# Patient Record
Sex: FEMALE | Race: BLACK OR AFRICAN AMERICAN | NOT HISPANIC OR LATINO
[De-identification: names, ages, dates, MRNs, and addresses within clinical notes are randomized per-mention and may not be internally consistent; named-entity substitution may affect disease eponyms.]

---
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
## 2023-08-03 ENCOUNTER — APPOINTMENT (OUTPATIENT)
Dept: DERMATOLOGY | Facility: CLINIC | Age: 18
End: 2023-08-03
Payer: COMMERCIAL

## 2023-08-03 DIAGNOSIS — L81.0 POSTINFLAMMATORY HYPERPIGMENTATION: ICD-10-CM

## 2023-08-03 DIAGNOSIS — L90.5 SCAR CONDITIONS AND FIBROSIS OF SKIN: ICD-10-CM

## 2023-08-03 PROCEDURE — 99214 OFFICE O/P EST MOD 30 MIN: CPT

## 2023-08-03 RX ORDER — TRETINOIN 1 MG/G
0.1 CREAM TOPICAL
Qty: 1 | Refills: 6 | Status: ACTIVE | COMMUNITY
Start: 2018-09-29 | End: 1900-01-01

## 2023-08-03 RX ORDER — DOXYCYCLINE HYCLATE 100 MG/1
100 TABLET ORAL
Qty: 60 | Refills: 2 | Status: ACTIVE | COMMUNITY
Start: 2022-08-11 | End: 1900-01-01

## 2023-08-03 RX ORDER — CLINDAMYCIN PHOSPHATE 10 MG/ML
1 SOLUTION TOPICAL
Qty: 1 | Refills: 10 | Status: ACTIVE | COMMUNITY
Start: 2018-03-30 | End: 1900-01-01

## 2023-08-03 NOTE — HISTORY OF PRESENT ILLNESS
[FreeTextEntry1] : RPA- Acne [de-identified] : 17-year-old female presenting today for evaluation of the below  1. F/u Acne vulgaris, face and trunk x years. Last prescribed Doxy 100 mg PO daily x 3 months at Nov 2022 telehealth appointment with Dr. Hastings. Face was clear.  Washing with PanOxyl 10% BPO qHS, applying Tretinoin 0.05% qHS, tolerating well. Applying Clinda 1% swabs qAM, and CeraVe foaming wash qAM.  Applying SPF 50 daily. Denies flares with menses. No OCP, no IUD.  Mom with hx of acne, required PO medications.   No personal or family history of skin cancer.  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

## 2023-08-03 NOTE — PHYSICAL EXAM
[FreeTextEntry3] : Many pustules with few inflammatory papules on the chin, nose, cheeks and forehead admixed with comedones  Few small inflammatory papules on the back and chest admixed with comedones and surrounding small brown, hyperpigmented macules

## 2023-08-03 NOTE — ASSESSMENT
[FreeTextEntry1] : 1. Acne vulgaris, moderate inflammatory w/ comedonal component, flaring on the face and trunk S/p multiple 3-month courses of Doxycycline 100 mg PO daily.  - Diagnosis and course of condition discussed. - Given recalcitrance to topicals, continued flares off Doxycycline, and scarring from inadequate control, discussed risks/benefits/alternatives of Isotretinoin. Patient is NOT a candidate today as she is in college at [employer] and would be unable to make monthly in-person appointments.  - Explained that a typical course of isotretinoin can be approximately 5-8 months and that monthly in-office visits are required to continue on this therapy. - Explained that although isotretinoin works very well for acne it is not a cure. Reiterated that some patients may need a second (or even third) course of the medication. - Explained that acne may flare at the beginning of treatment. If acne flares significantly the dose of the medication can be lowered if needed. - Explained that while on isotretinoin all other acne medications, topical and systemic, will be discontinued. - Explained to patient that he may not share her medication with others or donate blood. Those of child-bearing potential must have two negative pregnancy tests prior to starting and cannot have children/conceive until at least 1 month after completing treatment. - Patient denied a history of depression or suicidal ideation. Explained to the patient that should this occur, the medication should be stopped immediately and he should seek immediate medical attention. - Reviewed common side effects including but not limited to dry skin, dry lips, dry eyes (may need to wear glasses instead of contact lenses during course of treatment, if symptoms are bothersome), nosebleeds, increased skin fragility, muscle aches, joint aches, possible transient hair loss, increase in triglycerides or cholesterol (which would necessitate a low-fat diet and possible discontinuation of the medication if indicated), possible decrease in night vision, and sun sensitivity.  - Reviewed uncommon but serious side effects, that should prompt discontinuation of the medication and re-evaluation, including but not limited to severe headaches, nausea, vomiting, diarrhea, change in bowel habits, severe stomach pain, rectal bleeding, yellowing of the skin, dark urine, blurred vision, poor night vision (avoid driving at night if so), bone and muscle aches, changes in mood, depression, and suicidal ideation. Explained that there is no proven link between isotretinoin use and inflammatory bowel disease; however, case reports have been noted. Medication may also cause pseudotumor cerebri and hyperostosis. - Avoid cometic procedure/surgery for a few months after discontinuing medication due to risk of scarring and photosensitivity  - Female patients MUST use two simultaneous methods of birth control: or abstinence alone.  PLAN: - Re-start Doxycycline 100 mg PO but plan for BID. Start Doxycycline 100mg BID, taken with food and water for 3 months.  We have discussed the proper use and possible associated adverse effects, including GI distress, photosensitivity, hypersensitivity reaction, and esophageal ulceration.  Patient is aware not to take medication with dairy. Patient counseled not to lie down for at least 1 hour after taking the medication.  - STOP washing with OTC BPO 10% qHS and switch to qAM.  - C/w Clindamycin 1% swabs to face, chest, and back qAM.  - INCREASE Tretinoin 0.05%>> 0.1% crm at bedtime. Apply pea size amt spread thinly over entire face, 30 min after washing face, every other night for 2 wks, then advance to qhs as tolerated. SED.  - Discussed liberal use of non- comedogenic moisturizers.  - Patient to check her sunscreen to make sure that it is non-comedogenic.   RTC 4-6 months when Patient is home from college or virtual appointment.

## 2024-01-17 ENCOUNTER — APPOINTMENT (OUTPATIENT)
Dept: DERMATOLOGY | Facility: CLINIC | Age: 19
End: 2024-01-17

## 2024-01-17 NOTE — ASSESSMENT
[FreeTextEntry1] : 1. Acne vulgaris, moderate inflammatory w/ comedonal component, flaring on the face and trunk S/p multiple 3-month courses of Doxycycline 100 mg PO daily. - Diagnosis and course of condition discussed. - Given recalcitrance to topicals, continued flares off Doxycycline, and scarring from inadequate control, discussed risks/benefits/alternatives of Isotretinoin. Patient is NOT a candidate today as she is in college at [employer] and would be unable to make monthly in-person appointments. - Explained that a typical course of isotretinoin can be approximately 5-8 months and that monthly in-office visits are required to continue on this therapy. - Explained that although isotretinoin works very well for acne it is not a cure. Reiterated that some patients may need a second (or even third) course of the medication. - Explained that acne may flare at the beginning of treatment. If acne flares significantly the dose of the medication can be lowered if needed. - Explained that while on isotretinoin all other acne medications, topical and systemic, will be discontinued. - Explained to patient that he may not share her medication with others or donate blood. Those of child-bearing potential must have two negative pregnancy tests prior to starting and cannot have children/conceive until at least 1 month after completing treatment. - Patient denied a history of depression or suicidal ideation. Explained to the patient that should this occur, the medication should be stopped immediately and he should seek immediate medical attention. - Reviewed common side effects including but not limited to dry skin, dry lips, dry eyes (may need to wear glasses instead of contact lenses during course of treatment, if symptoms are bothersome), nosebleeds, increased skin fragility, muscle aches, joint aches, possible transient hair loss, increase in triglycerides or cholesterol (which would necessitate a low-fat diet and possible discontinuation of the medication if indicated), possible decrease in night vision, and sun sensitivity. - Reviewed uncommon but serious side effects, that should prompt discontinuation of the medication and re-evaluation, including but not limited to severe headaches, nausea, vomiting, diarrhea, change in bowel habits, severe stomach pain, rectal bleeding, yellowing of the skin, dark urine, blurred vision, poor night vision (avoid driving at night if so), bone and muscle aches, changes in mood, depression, and suicidal ideation. Explained that there is no proven link between isotretinoin use and inflammatory bowel disease; however, case reports have been noted. Medication may also cause pseudotumor cerebri and hyperostosis. - Avoid cometic procedure/surgery for a few months after discontinuing medication due to risk of scarring and photosensitivity - Female patients MUST use two simultaneous methods of birth control: or abstinence alone.  PLAN: - Re-start Doxycycline 100 mg PO but plan for BID. Start Doxycycline 100mg BID, taken with food and water for 3 months. We have discussed the proper use and possible associated adverse effects, including GI distress, photosensitivity, hypersensitivity reaction, and esophageal ulceration. Patient is aware not to take medication with dairy. Patient counseled not to lie down for at least 1 hour after taking the medication.  - STOP washing with OTC BPO 10% qHS and switch to qAM. - C/w Clindamycin 1% swabs to face, chest, and back qAM. - INCREASE Tretinoin 0.05%>> 0.1% crm at bedtime. Apply pea size amt spread thinly over entire face, 30 min after washing face, every other night for 2 wks, then advance to qhs as tolerated. SED. - Discussed liberal use of non- comedogenic moisturizers. - Patient to check her sunscreen to make sure that it is non-comedogenic.  RTC 4-6 months when Patient is home from college or virtual appointment.

## 2024-01-17 NOTE — HISTORY OF PRESENT ILLNESS
[FreeTextEntry1] : RPA- Acne [de-identified] : 18-year-old female last seen August 2023, presenting today for evaluation of the below  1. F/u Acne vulgaris, face and trunk x years. Increased Tretinoin 0.1% at last visit.  Last prescribed Doxy 100 mg PO daily x 3 months at Nov 2022 telehealth appointment with Dr. Hastings. Face was clear.  Washing with PanOxyl 10% BPO qHS, applying Tretinoin 0.05% qHS, tolerating well. Applying Clinda 1% swabs qAM, and CeraVe foaming wash qAM.  Applying SPF 50 daily. Denies flares with menses. No OCP, no IUD.  Mom with hx of acne, required PO medications.   No personal or family history of skin cancer.  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

## 2024-06-06 ENCOUNTER — APPOINTMENT (OUTPATIENT)
Dept: DERMATOLOGY | Facility: CLINIC | Age: 19
End: 2024-06-06
Payer: COMMERCIAL

## 2024-06-06 DIAGNOSIS — L21.9 SEBORRHEIC DERMATITIS, UNSPECIFIED: ICD-10-CM

## 2024-06-06 DIAGNOSIS — L70.9 ACNE, UNSPECIFIED: ICD-10-CM

## 2024-06-06 PROCEDURE — 99214 OFFICE O/P EST MOD 30 MIN: CPT

## 2024-06-06 RX ORDER — KETOCONAZOLE 20.5 MG/ML
2 SHAMPOO, SUSPENSION TOPICAL
Qty: 1 | Refills: 11 | Status: ACTIVE | COMMUNITY
Start: 2024-06-06 | End: 1900-01-01

## 2024-06-06 RX ORDER — FLUOCINONIDE 0.5 MG/ML
0.05 SOLUTION TOPICAL
Qty: 1 | Refills: 6 | Status: ACTIVE | COMMUNITY
Start: 2024-06-06 | End: 1900-01-01

## 2024-06-06 RX ORDER — BENZOYL PEROXIDE 100 MG/ML
10 LIQUID TOPICAL
Qty: 1 | Refills: 11 | Status: ACTIVE | COMMUNITY
Start: 2020-10-12 | End: 1900-01-01

## 2024-06-06 RX ORDER — TAZAROTENE 1 MG/G
0.1 CREAM TOPICAL
Qty: 1 | Refills: 4 | Status: ACTIVE | COMMUNITY
Start: 2024-06-06 | End: 1900-01-01

## 2024-06-06 RX ORDER — DAPSONE 75 MG/G
7.5 GEL TOPICAL
Qty: 1 | Refills: 2 | Status: ACTIVE | COMMUNITY
Start: 2024-06-06 | End: 1900-01-01

## 2024-08-22 ENCOUNTER — APPOINTMENT (OUTPATIENT)
Dept: DERMATOLOGY | Facility: CLINIC | Age: 19
End: 2024-08-22

## 2025-03-13 ENCOUNTER — APPOINTMENT (OUTPATIENT)
Dept: DERMATOLOGY | Facility: CLINIC | Age: 20
End: 2025-03-13
Payer: COMMERCIAL

## 2025-03-13 DIAGNOSIS — L70.9 ACNE, UNSPECIFIED: ICD-10-CM

## 2025-03-13 DIAGNOSIS — L81.0 POSTINFLAMMATORY HYPERPIGMENTATION: ICD-10-CM

## 2025-03-13 PROCEDURE — 99214 OFFICE O/P EST MOD 30 MIN: CPT

## 2025-03-13 RX ORDER — CLINDAMYCIN PHOSPHATE 10 MG/ML
1 LOTION TOPICAL
Qty: 1 | Refills: 11 | Status: ACTIVE | COMMUNITY
Start: 2025-03-13 | End: 1900-01-01

## 2025-03-13 RX ORDER — SPIRONOLACTONE 25 MG/5ML
25 SUSPENSION ORAL AT BEDTIME
Qty: 600 | Refills: 4 | Status: ACTIVE | COMMUNITY
Start: 2025-03-13 | End: 1900-01-01

## 2025-06-11 ENCOUNTER — NON-APPOINTMENT (OUTPATIENT)
Age: 20
End: 2025-06-11

## 2025-06-11 ENCOUNTER — APPOINTMENT (OUTPATIENT)
Dept: DERMATOLOGY | Facility: CLINIC | Age: 20
End: 2025-06-11

## 2025-06-11 VITALS — HEIGHT: 62 IN | BODY MASS INDEX: 25.76 KG/M2 | WEIGHT: 140 LBS

## 2025-06-11 PROCEDURE — 99214 OFFICE O/P EST MOD 30 MIN: CPT
